# Patient Record
Sex: MALE | Race: WHITE | NOT HISPANIC OR LATINO | URBAN - METROPOLITAN AREA
[De-identification: names, ages, dates, MRNs, and addresses within clinical notes are randomized per-mention and may not be internally consistent; named-entity substitution may affect disease eponyms.]

---

## 2024-05-10 ENCOUNTER — EMERGENCY (EMERGENCY)
Facility: HOSPITAL | Age: 29
LOS: 1 days | Discharge: HOME | End: 2024-05-10
Admitting: EMERGENCY MEDICINE
Payer: OTHER MISCELLANEOUS

## 2024-05-10 VITALS
RESPIRATION RATE: 18 BRPM | SYSTOLIC BLOOD PRESSURE: 126 MMHG | TEMPERATURE: 98 F | OXYGEN SATURATION: 98 % | HEART RATE: 66 BPM | DIASTOLIC BLOOD PRESSURE: 81 MMHG

## 2024-05-10 VITALS — SYSTOLIC BLOOD PRESSURE: 125 MMHG | HEART RATE: 68 BPM | OXYGEN SATURATION: 98 % | DIASTOLIC BLOOD PRESSURE: 80 MMHG

## 2024-05-10 PROCEDURE — 73130 X-RAY EXAM OF HAND: CPT | Mod: 26,RT

## 2024-05-10 PROCEDURE — 99284 EMERGENCY DEPT VISIT MOD MDM: CPT | Mod: 25

## 2024-05-10 PROCEDURE — 12042 INTMD RPR N-HF/GENIT2.6-7.5: CPT

## 2024-05-10 PROCEDURE — 12001 RPR S/N/AX/GEN/TRNK 2.5CM/<: CPT

## 2024-05-10 RX ORDER — IBUPROFEN 200 MG
600 TABLET ORAL ONCE
Refills: 0 | Status: COMPLETED | OUTPATIENT
Start: 2024-05-10 | End: 2024-05-10

## 2024-05-10 RX ADMIN — Medication 600 MILLIGRAM(S): at 20:23

## 2024-05-10 NOTE — ED PROVIDER NOTE - NS ED ROS FT
Review of Systems    Constitutional: (-) fever  Musculoskeletal: (-) neck pain, (-) back pain, (-) joint pain  Integumentary: (-) rash, (-) edema  Neurological: (-) weakness, (-) numbness  Heme/Lymph: (-) easy bruising (-) easy bleeding

## 2024-05-10 NOTE — ED PROCEDURE NOTE - CPROC ED LACER REPAIR DETAIL1
The wound was explored to base in bloodless field./No foreign body
The wound was explored to base in bloodless field./No foreign body
applied splint for protection/The wound was explored to base in bloodless field./No foreign body/Multiple flaps were aligned.

## 2024-05-10 NOTE — ED ADULT NURSE NOTE - OBJECTIVE STATEMENT
28 y/o M here with mechanical fall on to glass bottled that shattered.  pt presents with lacerations to 1st and 2nd digit right hand

## 2024-05-10 NOTE — ED ADULT NURSE NOTE - FINAL NURSING ELECTRONIC SIGNATURE
Thank you for visiting Our M Health Fairview University of Minnesota [facility] [phone]                
10-May-2024 23:23

## 2024-05-10 NOTE — ED PROVIDER NOTE - CLINICAL SUMMARY MEDICAL DECISION MAKING FREE TEXT BOX
well appearing pt here with R 2 x1st and 1x2nd digit lacerations with bleeding controlled with pressure with large flap on the 2nd digit with bleeding controlled with pressure, tdap is up to date, plan: repair, dc

## 2024-05-10 NOTE — ED PROCEDURE NOTE - CPROC ED TIME OUT STATEMENT1
“Patient's name, , procedure and correct site were confirmed during the [place] Timeout.”
“Patient's name, , procedure and correct site were confirmed during the [place] Timeout.”

## 2024-05-10 NOTE — ED PROVIDER NOTE - OBJECTIVE STATEMENT
28 yo m no si gpmhx pw 3 laceration deep on the hand and 2 superficial reports that he was working walking with glass bottle fell breaking the glass bottle in his hand with bleeding controlled with pressure on digit 1 and 2. tdap is up to date    I have reviewed available current nursing and previous documentation of past medical, surgical, family, and/or social history.

## 2024-05-10 NOTE — ED PROVIDER NOTE - PHYSICAL EXAMINATION
Physical Exam    Vital Signs: I have reviewed the initial vital signs.  Constitutional: well-appearing, appears stated age  Cardiovascular: regular rate, regular rhythm, well-perfused extremities, cap refill <2 sec b/l  Musculoskeletal: +R 1st digit laceration 1.5 cm and 1 cm gaping, no tendon injury; R 2nd digit laceration 3 cm with large flap subcutaneous no tendon involvement   Integumentary: warm, dry, no rash  Neurologic: awake, alert, cranial nerves II-XII grossly intact, extremities’ motor and sensory functions grossly intact

## 2024-05-10 NOTE — ED PROVIDER NOTE - PATIENT PORTAL LINK FT
You can access the FollowMyHealth Patient Portal offered by [facility] by registering at the following website: [email or web address]. By joining [employer]’s FollowMyHealth portal, you will also be able to view your health information using other applications (apps) compatible with our system.

## 2024-05-10 NOTE — ED ADULT TRIAGE NOTE - CHIEF COMPLAINT QUOTE
mechanical fall on to glass bottled that shattered.  pt presents with lacerations to 1st and 2nd digit right hand

## 2024-05-14 DIAGNOSIS — S61.011A LACERATION WITHOUT FOREIGN BODY OF RIGHT THUMB WITHOUT DAMAGE TO NAIL, INITIAL ENCOUNTER: ICD-10-CM
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
## 2024-05-14 DIAGNOSIS — S61.210A LACERATION WITHOUT FOREIGN BODY OF RIGHT INDEX FINGER WITHOUT DAMAGE TO NAIL, INITIAL ENCOUNTER: ICD-10-CM
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
## 2024-05-20 ENCOUNTER — EMERGENCY (EMERGENCY)
Facility: HOSPITAL | Age: 29
LOS: 1 days | Discharge: HOME | End: 2024-05-20
Attending: EMERGENCY MEDICINE | Admitting: EMERGENCY MEDICINE
Payer: OTHER MISCELLANEOUS

## 2024-05-20 VITALS
TEMPERATURE: 99 F | RESPIRATION RATE: 18 BRPM | HEART RATE: 56 BPM | SYSTOLIC BLOOD PRESSURE: 115 MMHG | DIASTOLIC BLOOD PRESSURE: 71 MMHG | OXYGEN SATURATION: 98 %

## 2024-05-20 PROCEDURE — L9995: CPT

## 2024-05-20 NOTE — ED PROVIDER NOTE - PATIENT PORTAL LINK FT
You can access the FollowMyHealth Patient Portal offered by [facility] by registering at the following website: [email or web address]. By joining [employer]’s FollowMyHealth portal, you will also be able to view your health information using other applications (apps) compatible with our system.

## 2024-05-20 NOTE — ED PROCEDURE NOTE - PROCEDURE ADDITIONAL DETAILS
all sutures removed from individual lacerations all sutures removed from individual lacerations 3, 6 and 13

## 2024-05-20 NOTE — ED PROVIDER NOTE - OBJECTIVE STATEMENT
28 yo male to er with GF for suture removal to left thumb placed on may 10th at [facility]  records reviewed.    pt denies increased pain dc swelling to thumb  no fever

## 2024-05-20 NOTE — ED PROVIDER NOTE - MUSCULOSKELETAL, MLM
well healed laceration to right distal thumb multiple sutures in place and intact no erythema no dc no increased warmth

## 2024-05-20 NOTE — ED PROVIDER NOTE - NSFOLLOWUPINSTRUCTIONS_ED_ALL_ED_FT
EP Catheter inserted. 1. stay well hydrated  2. keep healing wound clean and dry   3. return to ER if finger becomes red hot swollen or painful

## 2024-05-23 DIAGNOSIS — S61.012D LACERATION WITHOUT FOREIGN BODY OF LEFT THUMB WITHOUT DAMAGE TO NAIL, SUBSEQUENT ENCOUNTER: ICD-10-CM

## 2025-05-16 ENCOUNTER — EMERGENCY (EMERGENCY)
Facility: HOSPITAL | Age: 30
LOS: 1 days | End: 2025-05-16
Admitting: EMERGENCY MEDICINE
Payer: OTHER MISCELLANEOUS

## 2025-05-16 VITALS
DIASTOLIC BLOOD PRESSURE: 71 MMHG | TEMPERATURE: 99 F | HEIGHT: 68 IN | HEART RATE: 84 BPM | OXYGEN SATURATION: 95 % | SYSTOLIC BLOOD PRESSURE: 146 MMHG | RESPIRATION RATE: 16 BRPM | WEIGHT: 154.98 LBS
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
## 2025-05-16 PROCEDURE — 73564 X-RAY EXAM KNEE 4 OR MORE: CPT | Mod: 26,LT

## 2025-05-16 PROCEDURE — 99284 EMERGENCY DEPT VISIT MOD MDM: CPT

## 2025-05-16 RX ORDER — METHOCARBAMOL 500 MG/1
1500 TABLET, FILM COATED ORAL ONCE
Refills: 0 | Status: COMPLETED | OUTPATIENT
Start: 2025-05-16 | End: 2025-05-16

## 2025-05-16 RX ORDER — ACETAMINOPHEN 500 MG/5ML
2 LIQUID (ML) ORAL
Qty: 20 | Refills: 0
Start: 2025-05-16

## 2025-05-16 RX ORDER — KETOROLAC TROMETHAMINE 30 MG/ML
30 INJECTION, SOLUTION INTRAMUSCULAR; INTRAVENOUS ONCE
Refills: 0 | Status: DISCONTINUED | OUTPATIENT
Start: 2025-05-16 | End: 2025-05-16

## 2025-05-16 RX ORDER — NAPROXEN SODIUM 275 MG
1 TABLET ORAL
Qty: 14 | Refills: 0
Start: 2025-05-16

## 2025-05-16 RX ADMIN — METHOCARBAMOL 1500 MILLIGRAM(S): 500 TABLET, FILM COATED ORAL at 22:07

## 2025-05-16 RX ADMIN — KETOROLAC TROMETHAMINE 30 MILLIGRAM(S): 30 INJECTION, SOLUTION INTRAMUSCULAR; INTRAVENOUS at 22:07

## 2025-05-16 NOTE — ED PROVIDER NOTE - CARE PROVIDER_API CALL
Chinmay Pennington  Orthopaedic Surgery  [address] [place] [address]  Phone: [phone]  Fax: [phone]  Follow Up Time:

## 2025-05-16 NOTE — ED PROVIDER NOTE - CLINICAL SUMMARY MEDICAL DECISION MAKING FREE TEXT BOX
pt with atraumatic L knee pain s/p getting up from kneeling position today, no associated fall or trauma, no focal neuro deficits, NV intact, compartments soft, Xray negative for acute fx or bony injury, ACE wrap applied, encouraged RICE to affected region, weight bear as tolerated, f/u on call orthopedist, strict return precautions discussed, pt verbalized understanding. pt with atraumatic L knee pain s/p getting up from kneeling position today, no associated fall or trauma, no focal neuro deficits, NV intact, compartments soft, Xray negative for acute fx or bony injury, old corticated osteophytes noted, ACE wrap applied and crutches provided, encouraged RICE to affected region, weight bear as tolerated, f/u on call orthopedist, strict return precautions discussed, pt verbalized understanding.

## 2025-05-16 NOTE — ED PROVIDER NOTE - PHYSICAL EXAMINATION
Gen - WDWN, NAD, speaking in full sentences  Skin - no acute rash, intact   HEENT - AT/NC, no conjunctival injection or dc, neck supple and FROM, airway patent   CV - S1S2, R/R/R  Resp - CTAB, no focal r/r/w   MSK - w/w/p, full ROM of peripheral joints, no midline tenderness   Psych - euphoria, normal speech and eye contact, judgement/insight intact   Neuro - AxOx3, ambulatory with steady gait Gen - WDWN, NAD, speaking in full sentences  Skin - no acute rash, intact   HEENT - AT/NC, PERRL, no conjunctival injection or dc, neck supple and FROM, airway patent   CV - S1S2, R/R/R  Resp - CTAB, no focal r/r/w   MSK - w/w/p, L knee with TTP over bi-patellar region, no edema, ecchymosis, crepitus, laxity, mild fluctuance, NV intact, +SILT, symmetric pulses b/l, compartment soft,   Psych - euphoria, normal speech and eye contact, judgement/insight intact   Neuro - AxOx3, no focal neuro deficits, ambulatory with mild limp on the L

## 2025-05-16 NOTE — ED PROVIDER NOTE - OBJECTIVE STATEMENT
31 yo M with PMHx of L knee injury in 2018, currently going through outpt PT, p/w worsening left knee pain that started after he bent down while at work and attempted to get up this afternoon. States pain is worse when he attempts to extend the knee. Denies recent fall, direct trauma, LOC, break in the skin, paresthesia, numbness, tingling, redness, bleeding, d/c, HA, dizziness, SOB, CP, palpitations, N/V, focal weakness, neck/back pain, and malaise 29 yo M with PMHx of L knee injury in 2018, currently going through outpt PT, p/w worsening left knee pain that started after he bent down while at work and attempted to get up this afternoon.  States pain is worse when he attempts to extend the knee. Denies recent fall, direct trauma, LOC, break in the skin, paresthesia, numbness, tingling, redness, bleeding, d/c, HA, dizziness, SOB, CP, palpitations, N/V, focal weakness, neck/back pain, and malaise 31 yo M with PMHx of L knee injury in 2018, currently going through outpt PT, p/w worsening left knee pain that started after he bent down while at work and attempted to get up this afternoon. Pt was seated in a kneel/knee bent position and bouncing up and down to stretch his legs, but noted pain when he tried to get up. States pain is worse when he attempts to extend the knee. Denies recent fall, direct trauma, LOC, break in the skin, paresthesia, numbness, tingling, redness, bleeding, d/c, HA, dizziness, SOB, CP, palpitations, N/V, focal weakness, neck/back pain, and malaise

## 2025-05-16 NOTE — ED ADULT NURSE NOTE - NSFALLRISKINTERV_ED_ALL_ED
Assistance OOB with selected safe patient handling equipment if applicable/Assistance with ambulation/Communicate fall risk and risk factors to all staff, patient, and family/Monitor gait and stability/Provide visual cue: yellow wristband, yellow gown, etc/Reinforce activity limits and safety measures with patient and family/Call bell, personal items and telephone in reach/Instruct patient to call for assistance before getting out of bed/chair/stretcher/Non-slip footwear applied when patient is off stretcher/[place] to call system/Physically safe environment - no spills, clutter or unnecessary equipment/Purposeful Proactive Rounding/Room/bathroom lighting operational, light cord in reach

## 2025-05-16 NOTE — ED ADULT TRIAGE NOTE - CHIEF COMPLAINT QUOTE
Pt with complaint of left knee pain that started after he bent down while at work and attempted to get up. Pt reports history of knee injury in 2018 and currently in physical therapy for it. States pain is worse when he attempts to extend the knee.

## 2025-05-16 NOTE — ED PROVIDER NOTE - PATIENT PORTAL LINK FT
You can access the FollowMyHealth Patient Portal offered by [facility] by registering at the following website: [email or web address]. By joining [employer]’s FollowMyHealth portal, you will also be able to view your health information using other applications (apps) compatible with our system.
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
## 2025-05-20 DIAGNOSIS — S83.92XA SPRAIN OF UNSPECIFIED SITE OF LEFT KNEE, INITIAL ENCOUNTER: ICD-10-CM

## 2025-05-20 DIAGNOSIS — M25.562 PAIN IN LEFT KNEE: ICD-10-CM
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
